# Patient Record
Sex: MALE | Race: WHITE | Employment: OTHER | URBAN - METROPOLITAN AREA
[De-identification: names, ages, dates, MRNs, and addresses within clinical notes are randomized per-mention and may not be internally consistent; named-entity substitution may affect disease eponyms.]

---

## 2021-01-16 ENCOUNTER — HOSPITAL ENCOUNTER (EMERGENCY)
Age: 76
Discharge: HOME | End: 2021-01-16
Attending: EMERGENCY MEDICINE
Payer: MEDICARE

## 2021-01-16 ENCOUNTER — APPOINTMENT (OUTPATIENT)
Dept: GENERAL RADIOLOGY | Age: 76
End: 2021-01-16
Payer: MEDICARE

## 2021-01-16 VITALS
WEIGHT: 140 LBS | RESPIRATION RATE: 18 BRPM | SYSTOLIC BLOOD PRESSURE: 165 MMHG | OXYGEN SATURATION: 96 % | DIASTOLIC BLOOD PRESSURE: 74 MMHG | BODY MASS INDEX: 18.96 KG/M2 | HEART RATE: 59 BPM | TEMPERATURE: 97.8 F | HEIGHT: 72 IN

## 2021-01-16 DIAGNOSIS — S61.209A AVULSION, FINGER TIP, INITIAL ENCOUNTER: Primary | ICD-10-CM

## 2021-01-16 PROCEDURE — 73130 X-RAY EXAM OF HAND: CPT

## 2021-01-16 PROCEDURE — 12001 RPR S/N/AX/GEN/TRNK 2.5CM/<: CPT

## 2021-01-16 PROCEDURE — 99282 EMERGENCY DEPT VISIT SF MDM: CPT

## 2021-01-16 PROCEDURE — 90715 TDAP VACCINE 7 YRS/> IM: CPT | Performed by: EMERGENCY MEDICINE

## 2021-01-16 PROCEDURE — 6360000002 HC RX W HCPCS: Performed by: EMERGENCY MEDICINE

## 2021-01-16 PROCEDURE — 90471 IMMUNIZATION ADMIN: CPT | Performed by: EMERGENCY MEDICINE

## 2021-01-16 RX ORDER — LEVOTHYROXINE SODIUM 0.03 MG/1
25 TABLET ORAL DAILY
COMMUNITY

## 2021-01-16 RX ORDER — AMLODIPINE BESYLATE 10 MG/1
10 TABLET ORAL DAILY
COMMUNITY

## 2021-01-16 RX ORDER — CEFDINIR 300 MG/1
300 CAPSULE ORAL 2 TIMES DAILY
Qty: 20 CAPSULE | Refills: 0 | Status: SHIPPED | OUTPATIENT
Start: 2021-01-16 | End: 2021-01-26

## 2021-01-16 RX ADMIN — TETANUS TOXOID, REDUCED DIPHTHERIA TOXOID AND ACELLULAR PERTUSSIS VACCINE, ADSORBED 0.5 ML: 5; 2.5; 8; 8; 2.5 SUSPENSION INTRAMUSCULAR at 10:33

## 2021-01-16 ASSESSMENT — ENCOUNTER SYMPTOMS
COUGH: 0
SORE THROAT: 0
EYE REDNESS: 0
DIARRHEA: 0
NAUSEA: 0
EYE DISCHARGE: 0
SINUS PRESSURE: 0
SHORTNESS OF BREATH: 0
BACK PAIN: 0
EYE PAIN: 0
WHEEZING: 0
VOMITING: 0
ABDOMINAL PAIN: 0

## 2021-01-16 ASSESSMENT — PAIN DESCRIPTION - LOCATION: LOCATION: FINGER (COMMENT WHICH ONE)

## 2021-01-16 NOTE — ED PROVIDER NOTES
[address] [facility]  Department of Emergency Medicine     Written by: Eleonora Nichols DO  Patient Name: Isaiah Nolasco  Attending Provider: Mena Allen DO  Admit Date: 2021 10:07 AM  MRN: [id number]                   : 1945        Chief Complaint   Patient presents with    Laceration     left index finger, cut with table saw    Hand/finger amputation     left index finger    - Chief complaint    Patient is a 61-year-old male past medical history of hypothyroidism, hypertension and dementia. Patient presents the chief complaint of left index finger pain. Patient states that he was using a table saw just prior to arrival when he cut his left index finger. He noted immediate pain. Bleeding has been well controlled with direct pressure. States the pain is sharp in nature currently rates an 8 out of 10. Pain is worsened with palpation denies any relieving factors. Patient denies any current blood thinner use. Patient has any fevers, chills, nausea, vomiting, chest pain, cough or shortness of breath. The history is provided by the patient. No  was used. Review of Systems   Constitutional: Negative for chills and fever. HENT: Negative for ear pain, sinus pressure and sore throat. Eyes: Negative for pain, discharge and redness. Respiratory: Negative for cough, shortness of breath and wheezing. Cardiovascular: Negative for chest pain. Gastrointestinal: Negative for abdominal pain, diarrhea, nausea and vomiting. Genitourinary: Negative for dysuria and frequency. Musculoskeletal: Positive for arthralgias. Negative for back pain. Skin: Positive for wound. Negative for rash. Neurological: Negative for weakness and headaches. Hematological: Negative for adenopathy. All other systems reviewed and are negative. Physical Exam  Vitals signs and nursing note reviewed. Constitutional:       Appearance: He is well-developed.    HENT:      Head: Normocephalic and atraumatic. Eyes:      Conjunctiva/sclera: Conjunctivae normal.   Neck:      Musculoskeletal: Normal range of motion and neck supple. Cardiovascular:      Rate and Rhythm: Normal rate and regular rhythm. Heart sounds: Normal heart sounds. No murmur. Pulmonary:      Effort: Pulmonary effort is normal. No respiratory distress. Breath sounds: Normal breath sounds. No wheezing or rales. Abdominal:      General: Bowel sounds are normal.      Palpations: Abdomen is soft. Tenderness: There is no abdominal tenderness. There is no guarding or rebound. Musculoskeletal:         General: Signs of injury present. No tenderness or deformity. Comments: Examination of left hand demonstrates a partial amputation of the tuft of the left index finger, there is soft tissue exposed, nailbed is intact however distal nail is severed from remainder of nail bed. Bleeding currently well controlled with direct pressure. Sensation intact, range of motion intact. Saline gauze placed over wound. Skin:     General: Skin is warm and dry. Neurological:      Mental Status: He is alert and oriented to person, place, and time. Cranial Nerves: No cranial nerve deficit. Coordination: Coordination normal.          Procedures     Digital Nerve Block Procedure Note  Indication: Analgesia for laceration repair    Procedure: The patient was placed in the appropriate position. Left index finger digital nerve block was performed by injection of 1% lidocaine without epinephrine. The patient tolerated the procedure well. Complications: None    PROCEDURE NOTE  1/16/21       Time: 1105    LACERATION REPAIR  Risks, benefits and alternatives (for applicable procedures below) described. Performed By: Naveen Echeverria DO. Laceration #: 1. Location: left index finger  Length: 2.5cm.   The wound area was irrigated with sterile saline, cleansed with chlorhexidine gluconate and draped in a sterile fashion. Local Anesthesia: Anesthesia was obtained utilizing digital nerve block. .  The wound was explored with the following results: No foreign body however the nurses seem to be distal tuft and distal nail involvement. .  Debridement: None. Undermining: None. Wound Margins Revised: no.  Flaps Aligned: yes. The wound was closed with 5-0 Ethilon using interrupted sutures. Dressing:  bacitracin and gauze was placed. Total number suture:  5. There were no additional lacerations requiring repair. MDM  Number of Diagnoses or Management Options  Avulsion, finger tip, initial encounter  Diagnosis management comments: Patient is a 45-year-old male past medical history of hypertension, hypothyroidism dementia. Patient presents with chief complaint of left ankle pain. Patient states that he lacerated his left index finger with a table saw just prior to arrival.  Vital signs stable on presentation. Physical exam, heart regular rate and rhythm, lungs clear to oscillation bilaterally, abdomen soft nontender. On examination of left hand there is a 2.5 cm circumferential laceration noted to the tip of the left index finger this does seem to involve the distal tuft and distal nail. There is a soft tissue deficit as well. Pulses intact to hand, range of motion within normal limits sensation intact. X-rays of the left hand demonstrate amputation of the tip of the second distal phalanx. Findings consistent with amputation of the distal phalanx. Decision made to close soft tissue deficit, patient provided verbal consent for digital nerve block and laceration repair. Please see corresponding procedure note, patient tolerated procedure well. Wound was dressed with triple antibiotic cream and on absorbent pad as well as soft gauze dressing. Patient's Tdap was updated. Patient was given cefdinir 300 mg twice daily for antibiotic prophylaxis.   Decision made to discharge patient, patient was instructed all antibiotics as prescribed. In addition patient instructed call his primary care doctor on Monday for follow-up and referral to orthopedic surgery. Patient instructed to keep dressing clean dry and intact the dressing becomes soiled he should replace with bandage. If patient noticed any new or worrisome symptoms he should return to emergency department for evaluation. Patient voiced understanding of all discharge instructions, patient was agreement plan of care and was discharged home in stable condition. Amount and/or Complexity of Data Reviewed  Tests in the radiology section of CPT®: ordered and reviewed    Risk of Complications, Morbidity, and/or Mortality  Presenting problems: moderate  Diagnostic procedures: moderate  Management options: moderate    Patient Progress  Patient progress: stable       ED Course as of Jan 16 1133   Sat Jan 16, 2021   1021 ATTENDING PROVIDER ATTESTATION:     I have personally performed and/or participated in the history, exam, medical decision making, and procedures and agree with all pertinent clinical information unless otherwise noted. I have also reviewed and agree with the past medical, family and social history unless otherwise noted. I have discussed this patient in detail with the resident and provided the instruction and education regarding the evidence-based evaluation and treatment of finger wound. \  History: Patient states she was using a radial arm saw today when his hand slipped causing laceration to the distal index finger left hand. Tetanus as noted today. No other injury. My findings: Catherine Coreas is a 77 y.o. male whom is in no distress. Physical exam reveals he is alert and oriented. Heart is regular lungs clear abdomen soft nontender extremities are intact without edema. Skin is warm and dry.   Attention to the left hand demonstrates the distal index finger has a near complete amputation of the very distal tuft of the finger that begins at about the midpoint of the nail. Nailbed remains intact. Soft tissue exposed. My plan: Symptomatic and supportive care. X-ray. Wound care. Tetanus update. Electronically signed by Anna Joe DO on 1/16/21 at 10:21 AM EST          [TG]   1125 Avulsed hip was sewed back in place. Patient understands high risk of necrotic due to vascular compromise. Dressing was placed by myself consisting of bacitracin, nonadherent dressing and tube gauze. [TG]      ED Course User Index  [TG] Anna Joe DO      --------------------------------------------- PAST HISTORY ---------------------------------------------  Past Medical History:  has a past medical history of Dementia ([address] Hypertension, and Thyroid disease. Past Surgical History:  has no past surgical history on file. Social History:      Family History: family history is not on file. The patients home medications have been reviewed. Allergies: Patient has no known allergies. -------------------------------------------------- RESULTS -------------------------------------------------  Labs:  No results found for this visit on 01/16/21. Radiology:  XR HAND LEFT (MIN 3 VIEWS)   Final Result   There has been amputation of the tip of the 2nd distal phalanx          ------------------------- NURSING NOTES AND VITALS REVIEWED ---------------------------  Date / Time Roomed:  1/16/2021 10:07 AM  ED Bed Assignment:  02/02    The nursing notes within the ED encounter and vital signs as below have been reviewed.    BP (!) 165/74   Pulse 59   Temp 97.8 °F (36.6 °C) (Oral)   Resp 18   Ht 6' (1.829 m)   Wt 140 lb (63.5 kg)   SpO2 96%   BMI 18.99 kg/m²   Oxygen Saturation Interpretation: Normal      ------------------------------------------ PROGRESS NOTES ------------------------------------------  12:04 PM EST  I have spoken with the patient and discussed todays results, in addition to providing specific details for the plan of care and counseling regarding the diagnosis and prognosis. Their questions are answered at this time and they are agreeable with the plan. I discussed at length with them reasons for immediate return here for re evaluation. They will followup with their primary care physician by calling their office on Monday.      --------------------------------- ADDITIONAL PROVIDER NOTES ---------------------------------  At this time the patient is without objective evidence of an acute process requiring hospitalization or inpatient management. They have remained hemodynamically stable throughout their entire ED visit and are stable for discharge with outpatient follow-up. The plan has been discussed in detail and they are aware of the specific conditions for emergent return, as well as the importance of follow-up. Discharge Medication List as of 1/16/2021 11:16 AM      START taking these medications    Details   cefdinir (OMNICEF) 300 MG capsule Take 1 capsule by mouth 2 times daily for 10 days, Disp-20 capsule, R-0Print             Diagnosis:  1. Avulsion, finger tip, initial encounter        Disposition:  Patient's disposition: Discharge to home  Patient's condition is stable. Patient was seen and evaluated by myself and my attending Bryan Lauren DO. Assessment and Plan discussed with attending provider, please see attestation for final plan of care.      DO Galdino Dodd DO  Resident  01/16/21 [address] [facility] [address],   Resident  01/16/21 [id number]

## 2021-02-23 ENCOUNTER — IMMUNIZATION (OUTPATIENT)
Dept: PRIMARY CARE CLINIC | Age: 76
End: 2021-02-23
Payer: MEDICARE

## 2021-02-23 PROCEDURE — 91301 COVID-19, MODERNA VACCINE 100MCG/0.5ML DOSE: CPT | Performed by: NURSE PRACTITIONER

## 2021-02-23 PROCEDURE — 0011A COVID-19, MODERNA VACCINE 100MCG/0.5ML DOSE: CPT | Performed by: NURSE PRACTITIONER

## 2021-03-23 ENCOUNTER — IMMUNIZATION (OUTPATIENT)
Dept: PRIMARY CARE CLINIC | Age: 76
End: 2021-03-23
Payer: MEDICARE

## 2021-03-23 PROCEDURE — 91301 COVID-19, MODERNA VACCINE 100MCG/0.5ML DOSE: CPT | Performed by: NURSE PRACTITIONER

## 2021-03-23 PROCEDURE — 0012A COVID-19, MODERNA VACCINE 100MCG/0.5ML DOSE: CPT | Performed by: NURSE PRACTITIONER

## 2022-02-01 ENCOUNTER — APPOINTMENT (OUTPATIENT)
Dept: CT IMAGING | Age: 77
End: 2022-02-01
Payer: MEDICARE

## 2022-02-01 ENCOUNTER — APPOINTMENT (OUTPATIENT)
Dept: GENERAL RADIOLOGY | Age: 77
End: 2022-02-01
Payer: MEDICARE

## 2022-02-01 ENCOUNTER — HOSPITAL ENCOUNTER (EMERGENCY)
Age: 77
Discharge: HOME | End: 2022-02-01
Payer: MEDICARE

## 2022-02-01 VITALS
HEART RATE: 80 BPM | RESPIRATION RATE: 16 BRPM | BODY MASS INDEX: 21.31 KG/M2 | SYSTOLIC BLOOD PRESSURE: 172 MMHG | HEIGHT: 76 IN | OXYGEN SATURATION: 97 % | DIASTOLIC BLOOD PRESSURE: 77 MMHG | WEIGHT: 175 LBS | TEMPERATURE: 96.8 F

## 2022-02-01 DIAGNOSIS — S40.021A CONTUSION OF RIGHT UPPER EXTREMITY, INITIAL ENCOUNTER: ICD-10-CM
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
## 2022-02-01 PROCEDURE — 73130 X-RAY EXAM OF HAND: CPT

## 2022-02-01 PROCEDURE — 99283 EMERGENCY DEPT VISIT LOW MDM: CPT

## 2022-02-01 PROCEDURE — 72125 CT NECK SPINE W/O DYE: CPT

## 2022-02-01 PROCEDURE — 73090 X-RAY EXAM OF FOREARM: CPT

## 2022-02-01 PROCEDURE — 73110 X-RAY EXAM OF WRIST: CPT

## 2022-02-01 PROCEDURE — 70450 CT HEAD/BRAIN W/O DYE: CPT

## 2022-02-01 ASSESSMENT — PAIN SCALES - GENERAL: PAINLEVEL_OUTOF10: 4

## 2022-02-01 ASSESSMENT — PAIN DESCRIPTION - PAIN TYPE: TYPE: ACUTE PAIN

## 2022-02-01 ASSESSMENT — PAIN DESCRIPTION - LOCATION: LOCATION: ARM

## 2022-02-01 NOTE — ED PROVIDER NOTES
[facility]    Department of Emergency Medicine   ED  Provider Note  Admit Date/RoomTime: 2/1/2022  5:16 PM  ED Room: [place] [id number]/INT-[id number]      2/1/22  5:17 PM EST    History of Present Illness:   Nicol Borrero is a 77 y.o. male presenting to the ED with family member, patient had a mechanical fall while walking to the car he tripped over his foot and landed on the ground prior to arrival. Patient landed on his knees and into the car. He states he did not hit his head. He denies LOC. Denies blood thinner use. He denies headache, neck or back pain. He does have a history of dementia. He is acting at baseline per the family member. He complains of right hand and arm pain. The complaint has been constant, mild in severity, and worsened by movement or palpation of the arm. No medications tried prior to arrival. No there reported complaints currently. He is ambulatory without assistive device. Review of Systems:   A complete review of systems was performed and pertinent positives and negatives are stated within HPI, all other systems reviewed and are negative.          --------------------------------------------- PAST HISTORY ---------------------------------------------  Past Medical History:  has a past medical history of Dementia ([facility] [address] Hypertension, and Thyroid disease. Past Surgical History:  has no past surgical history on file. Social History:  reports that he has never smoked. He has never used smokeless tobacco. He reports previous alcohol use. He reports that he does not use drugs. Family History: family history is not on file. Unless otherwise noted, family history is non contributory    The patients home medications have been reviewed. Allergies: Patient has no known allergies.     -------------------------------------------------- RESULTS -------------------------------------------------  All laboratory and radiology results have been personally reviewed by myself   LABS:  No results found for this visit on 02/01/22. RADIOLOGY:  Interpreted by Radiologist.  XR HAND RIGHT (MIN 3 VIEWS)   Final Result   Degenerative changes involving the right wrist but no radiographic evidence   of acute right hand pathology. XR WRIST RIGHT (MIN 3 VIEWS)   Final Result   No acute osseous abnormality. Degenerative changes carpal/metacarpal joint space of the thumb. XR RADIUS ULNA RIGHT (2 VIEWS)   Final Result   No definite radiographic evidence of acute right forearm pathology. CT Head WO Contrast   Final Result   CT head without contrast:      1. No skull fracture or acute intracranial abnormality. CT cervical spine without contrast:      1. No fracture or joint dislocation is seen. 2. 8 mm lucent/lytic lesion in the C7 vertebral body, which may be of a   benign or malignant etiology. Initial evaluation with MRI is recommended. 3. Degenerative changes, as described. RECOMMENDATIONS:   Unavailable         CT Cervical Spine WO Contrast   Final Result   CT head without contrast:      1. No skull fracture or acute intracranial abnormality. CT cervical spine without contrast:      1. No fracture or joint dislocation is seen. 2. 8 mm lucent/lytic lesion in the C7 vertebral body, which may be of a   benign or malignant etiology. Initial evaluation with MRI is recommended. 3. Degenerative changes, as described. RECOMMENDATIONS:   Unavailable             ------------------------- NURSING NOTES AND VITALS REVIEWED ---------------------------   The nursing notes within the ED encounter and vital signs as below have been reviewed.    BP (!) 172/77   Pulse 80   Temp 96.8 °F (36 °C) (Infrared)   Resp 16   Ht 6' 4\" (1.93 m)   Wt 175 lb (79.4 kg)   SpO2 97%   BMI 21.30 kg/m²   Oxygen Saturation Interpretation: Normal      ---------------------------------------------------PHYSICAL EXAM--------------------------------------    Constitutional/General: Alert and oriented to person & place (baseline), well appearing, non toxic in NAD, pleasant  Head: Normocephalic and atraumatic, no davila sign  Eyes: PERRL, EOMI, conjunctiva normal, sclera non icteric, no eye drainage, no nystgmus  Mouth: Oropharynx clear, without erythema, handling secretions, no trismus, no asymmetry of the posterior oropharynx or uvular edema. No tongue/lip swelling. tmj normal  Neck: Supple, full ROM, non tender to palpation in the midline, no stridor, no crepitus, no meningeal signs. No lymphadenopathy. Respiratory: Lungs clear to auscultation bilaterally, no wheezes, rales, or rhonchi. Not in respiratory distress. Respirations easy and unlabored. Cardiovascular:  S1S2. Regular rate. Regular rhythm. No murmurs, gallops, or rubs. 2+ distal pulses  Chest: No chest wall tenderness  GI:  Abdomen Soft, Non tender, Non distended. +BS x 4 quadrants. No organomegaly, no palpable masses,  No rebound, guarding, or rigidity. Musculoskeletal: Moves all extremities x 4. Warm and well perfused, no clubbing, cyanosis, or edema. Capillary refill <3 seconds. Right distal radius/ulna pain and right hand pain dorsally across metacarpal bones  Integument: skin warm and dry. No rashes. No ecchymosis. No lacerations or abrasions. Lymphatic: no lymphadenopathy noted  Neurologic: GCS 15, no focal deficits, symmetric strength 5/5 in the upper and lower extremities bilaterally. Neurovascularly intact. Psychiatric: Normal Affect      ------------------------------ ED COURSE/MEDICAL DECISION MAKING----------------------  Medications - No data to display      Medical Decision Making: At this time the patient is without objective evidence of an acute process requiring hospitalization or inpatient management. They have remained hemodynamically stable throughout their entire ED visit and are stable for discharge with outpatient follow up.    The plan has been discussed in detail and they are aware of the specific conditions for emergent return, as well as the importance of follow-up. Patient is a 76-year-old male presenting to the emergency department today following a mechanical fall which occurred prior to arrival.  Complaining of right hand and radius/ulna pain distally. Patient has a history of dementia and he is currently at his baseline mental status per family member. X-rays obtained of right hand, wrist, radius ulna which are negative for acute findings. Degenerative changes noted. CT head and cervical spine obtained which are negative for acute finding. Patient is able to ambulate in department safely. Patient advised to take Tylenol/Motrin for pain if needed. Close follow-up with primary care recommended. Patient and family member educated on warning signs for which they should return to the emergency department for and they verbalized understanding. Counseling: The emergency provider has spoken with the patient and discussed todays results, in addition to providing specific details for the plan of care and counseling regarding the diagnosis and prognosis. Questions are answered at this time and they are agreeable with the plan.      --------------------------------- IMPRESSION AND DISPOSITION ---------------------------------    IMPRESSION  1. Fall, initial encounter    2.  Contusion of right upper extremity, initial encounter        DISPOSITION  Disposition: Discharge to home  Patient condition is stable             ThereMAG Lemus CNP  02/08/22 2101